# Patient Record
Sex: FEMALE | Employment: FULL TIME | URBAN - METROPOLITAN AREA
[De-identification: names, ages, dates, MRNs, and addresses within clinical notes are randomized per-mention and may not be internally consistent; named-entity substitution may affect disease eponyms.]

---

## 2019-08-26 LAB
HPV ABSTRACT: NORMAL
PAP-ABSTRACT: ABNORMAL

## 2020-06-21 ENCOUNTER — NURSE TRIAGE (OUTPATIENT)
Dept: NURSING | Facility: CLINIC | Age: 25
End: 2020-06-21

## 2020-06-21 NOTE — TELEPHONE ENCOUNTER
"Pt calls in ( is an [facility] pt)    Saying she has had this mole on the back of her neck that she has been picking at for the last few days     Says it has now scabbed over and kind of looks like a \" blood blister\"    Pt advised to either call her Clinic in morning when open to discuss or bypass Primary and find a Dermatologist that she may like and insurance covers and call them to have mole evaluated     Protocol and care advice reviewed  Caller states understanding of the recommended disposition    Advised to call back if further questions or concerns    Mekhi Vaughn , RN / [facility]                              Reason for Disposition    [1] Skin growth or mole AND [2] bleeds or itches or is painful    Additional Information    Negative: [1] Looks infected AND [2] large red area (> 2 in. or 5 cm)    Negative: [1] Fever AND [2] bump is tender to touch    Negative: [1] Fever AND [2] bright red area or streak    Negative: [1] Looks infected (spreading redness, pus) AND [2] no fever    Negative: Looks like a boil, infected sore, or deep ulcer    Negative: Caller can't describe it clearly    Negative: [1] Skin growth or mole AND [2] sticks up out of the skin (elevated) AND [3] feels rough to the touch    Negative: [1] Skin growth or mole AND[2] larger than a pencil eraser, or increasing in size    Negative: [1] Skin growth or mole AND [2] changes color, or it has more than one color    Negative: [1] Skin growth or mole AND [2] border is irregular or blurry    Negative: [1] Skin growth or mole AND [2] two sides do not look the same (i.e., asymmetric)    Protocols used: SKIN LESION - MOLES OR GROWTHS-A-AH      "

## 2021-01-04 LAB
HEP C HIM: NORMAL
HIV 1&2 EXT: NORMAL

## 2021-01-18 ENCOUNTER — HOSPITAL ENCOUNTER (OUTPATIENT)
Dept: BEHAVIORAL HEALTH | Facility: CLINIC | Age: 26
Discharge: HOME | End: 2021-01-18
Attending: PSYCHIATRY & NEUROLOGY | Admitting: PSYCHIATRY & NEUROLOGY
Payer: COMMERCIAL

## 2021-01-18 VITALS — HEIGHT: 62 IN | BODY MASS INDEX: 26.68 KG/M2 | WEIGHT: 145 LBS

## 2021-01-18 PROCEDURE — H0001 ALCOHOL AND/OR DRUG ASSESS: HCPCS | Mod: 95

## 2021-01-18 RX ORDER — SERTRALINE HYDROCHLORIDE 100 MG/1
100 TABLET, FILM COATED ORAL
COMMUNITY
Start: 2020-12-09

## 2021-01-18 RX ORDER — ALBUTEROL SULFATE 90 UG/1
AEROSOL, METERED RESPIRATORY (INHALATION)
COMMUNITY
Start: 2021-01-08

## 2021-01-18 RX ORDER — BENZONATATE 100 MG/1
CAPSULE ORAL
COMMUNITY
Start: 2020-12-31 | End: 2021-12-15

## 2021-01-18 RX ORDER — BUSPIRONE HYDROCHLORIDE 10 MG/1
20 TABLET ORAL
COMMUNITY
Start: 2020-12-09

## 2021-01-18 RX ORDER — MONTELUKAST SODIUM 10 MG/1
10 TABLET ORAL
COMMUNITY
Start: 2019-09-07

## 2021-01-18 RX ORDER — TRAZODONE HYDROCHLORIDE 100 MG/1
TABLET ORAL
COMMUNITY
Start: 2020-12-14

## 2021-01-18 RX ORDER — SERTRALINE HYDROCHLORIDE 100 MG/1
TABLET, FILM COATED ORAL
COMMUNITY
Start: 2021-01-05

## 2021-01-18 RX ORDER — METRONIDAZOLE 500 MG/1
TABLET ORAL
COMMUNITY
Start: 2021-01-04 | End: 2021-12-15

## 2021-01-18 RX ORDER — DEXTROAMPHETAMINE SACCHARATE, AMPHETAMINE ASPARTATE MONOHYDRATE, DEXTROAMPHETAMINE SULFATE AND AMPHETAMINE SULFATE 7.5; 7.5; 7.5; 7.5 MG/1; MG/1; MG/1; MG/1
CAPSULE, EXTENDED RELEASE ORAL
COMMUNITY
Start: 2020-12-09

## 2021-01-18 RX ORDER — METRONIDAZOLE 500 MG/1
500 TABLET ORAL
COMMUNITY
Start: 2021-01-04 | End: 2021-12-15

## 2021-01-18 RX ORDER — MONTELUKAST SODIUM 10 MG/1
TABLET ORAL
COMMUNITY
Start: 2021-01-08

## 2021-01-18 RX ORDER — TRAZODONE HYDROCHLORIDE 100 MG/1
TABLET ORAL
COMMUNITY
Start: 2021-01-05

## 2021-01-18 RX ORDER — DULOXETIN HYDROCHLORIDE 60 MG/1
60 CAPSULE, DELAYED RELEASE ORAL
COMMUNITY
Start: 2019-09-07

## 2021-01-18 RX ORDER — BENZONATATE 100 MG/1
100 CAPSULE ORAL
COMMUNITY
Start: 2020-12-31 | End: 2021-12-15

## 2021-01-18 RX ORDER — ALBUTEROL SULFATE 90 UG/1
AEROSOL, METERED RESPIRATORY (INHALATION)
COMMUNITY
Start: 2020-03-03

## 2021-01-18 RX ORDER — BUSPIRONE HYDROCHLORIDE 10 MG/1
TABLET ORAL
COMMUNITY
Start: 2021-01-05

## 2021-01-18 RX ORDER — MOMETASONE FUROATE AND FORMOTEROL FUMARATE DIHYDRATE 100; 5 UG/1; UG/1
AEROSOL RESPIRATORY (INHALATION)
COMMUNITY
Start: 2020-05-11

## 2021-01-18 RX ORDER — DEXTROAMPHETAMINE SACCHARATE, AMPHETAMINE ASPARTATE MONOHYDRATE, DEXTROAMPHETAMINE SULFATE AND AMPHETAMINE SULFATE 7.5; 7.5; 7.5; 7.5 MG/1; MG/1; MG/1; MG/1
30 CAPSULE, EXTENDED RELEASE ORAL
COMMUNITY
Start: 2020-12-09

## 2021-01-18 ASSESSMENT — COLUMBIA-SUICIDE SEVERITY RATING SCALE - C-SSRS
REASONS FOR IDEATION LIFETIME: EQUALLY TO GET ATTENTION, REVENGE OR A REACTION FROM OTHERS AND TO END/STOP THE PAIN
6. HAVE YOU EVER DONE ANYTHING, STARTED TO DO ANYTHING, OR PREPARED TO DO ANYTHING TO END YOUR LIFE?: NO
TOTAL  NUMBER OF ABORTED OR SELF INTERRUPTED ATTEMPTS PAST LIFETIME: NO
5. HAVE YOU STARTED TO WORK OUT OR WORKED OUT THE DETAILS OF HOW TO KILL YOURSELF? DO YOU INTEND TO CARRY OUT THIS PLAN?: NO
TOTAL  NUMBER OF INTERRUPTED ATTEMPTS LIFETIME: NO
ATTEMPT LIFETIME: NO
TOTAL  NUMBER OF ABORTED OR SELF INTERRUPTED ATTEMPTS PAST 3 MONTHS: NO
6. HAVE YOU EVER DONE ANYTHING, STARTED TO DO ANYTHING, OR PREPARED TO DO ANYTHING TO END YOUR LIFE?: NO
4. HAVE YOU HAD THESE THOUGHTS AND HAD SOME INTENTION OF ACTING ON THEM?: NO
1. IN THE PAST MONTH, HAVE YOU WISHED YOU WERE DEAD OR WISHED YOU COULD GO TO SLEEP AND NOT WAKE UP?: NO
ATTEMPT PAST THREE MONTHS: NO
1. IN THE PAST MONTH, HAVE YOU WISHED YOU WERE DEAD OR WISHED YOU COULD GO TO SLEEP AND NOT WAKE UP?: YES
TOTAL  NUMBER OF INTERRUPTED ATTEMPTS PAST 3 MONTHS: NO

## 2021-01-18 ASSESSMENT — ANXIETY QUESTIONNAIRES
2. NOT BEING ABLE TO STOP OR CONTROL WORRYING: NEARLY EVERY DAY
3. WORRYING TOO MUCH ABOUT DIFFERENT THINGS: NEARLY EVERY DAY
5. BEING SO RESTLESS THAT IT IS HARD TO SIT STILL: SEVERAL DAYS
7. FEELING AFRAID AS IF SOMETHING AWFUL MIGHT HAPPEN: SEVERAL DAYS
IF YOU CHECKED OFF ANY PROBLEMS ON THIS QUESTIONNAIRE, HOW DIFFICULT HAVE THESE PROBLEMS MADE IT FOR YOU TO DO YOUR WORK, TAKE CARE OF THINGS AT HOME, OR GET ALONG WITH OTHER PEOPLE: EXTREMELY DIFFICULT
1. FEELING NERVOUS, ANXIOUS, OR ON EDGE: NEARLY EVERY DAY
6. BECOMING EASILY ANNOYED OR IRRITABLE: SEVERAL DAYS
GAD7 TOTAL SCORE: 12
4. TROUBLE RELAXING: NOT AT ALL

## 2021-01-18 ASSESSMENT — PATIENT HEALTH QUESTIONNAIRE - PHQ9: SUM OF ALL RESPONSES TO PHQ QUESTIONS 1-9: 24

## 2021-01-18 ASSESSMENT — MIFFLIN-ST. JEOR: SCORE: 1355.97

## 2021-01-18 ASSESSMENT — PAIN SCALES - GENERAL: PAINLEVEL: NO PAIN (0)
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
## 2021-01-18 NOTE — PROGRESS NOTES
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 will have open access to their mental health medical record.        Provider Name/ Credentials:  DANO Collado  January 18, 2021

## 2021-01-19 ASSESSMENT — ANXIETY QUESTIONNAIRES: GAD7 TOTAL SCORE: 12

## 2021-02-25 LAB
C TRACH DNA SPEC QL PROBE+SIG AMP: NEGATIVE
CREATININE (EXTERNAL): 0.69 MG/DL (ref 0.57–1.11)
GFR ESTIMATED (EXTERNAL): >60 ML/MIN/1.73M2
GFR ESTIMATED (IF AFRICAN AMERICAN) (EXTERNAL): >60 ML/MIN/1.73M2
GLUCOSE (EXTERNAL): 91 MG/DL (ref 65–100)
N GONORRHOEA DNA SPEC QL PROBE+SIG AMP: NEGATIVE
SPECIMEN DESCRIP: NORMAL
SPECIMEN DESCRIPTION: NORMAL

## 2021-03-02 ENCOUNTER — TRANSFERRED RECORDS (OUTPATIENT)
Dept: HEALTH INFORMATION MANAGEMENT | Facility: CLINIC | Age: 26
End: 2021-03-02

## 2021-03-07 ENCOUNTER — HEALTH MAINTENANCE LETTER (OUTPATIENT)
Age: 26
End: 2021-03-07

## 2021-07-02 LAB
ALT SERPL-CCNC: 16 IU/L (ref 8–45)
AST SERPL-CCNC: 20 IU/L (ref 2–40)

## 2021-10-10 ENCOUNTER — HEALTH MAINTENANCE LETTER (OUTPATIENT)
Age: 26
End: 2021-10-10

## 2021-12-15 ENCOUNTER — ANCILLARY PROCEDURE (OUTPATIENT)
Dept: GENERAL RADIOLOGY | Facility: OTHER | Age: 26
End: 2021-12-15
Attending: PODIATRIST
Payer: COMMERCIAL

## 2021-12-15 ENCOUNTER — OFFICE VISIT (OUTPATIENT)
Dept: PODIATRY | Facility: OTHER | Age: 26
End: 2021-12-15
Payer: COMMERCIAL

## 2021-12-15 ENCOUNTER — TELEPHONE (OUTPATIENT)
Dept: PODIATRY | Facility: CLINIC | Age: 26
End: 2021-12-15

## 2021-12-15 VITALS
HEIGHT: 62 IN | WEIGHT: 171 LBS | SYSTOLIC BLOOD PRESSURE: 136 MMHG | BODY MASS INDEX: 31.47 KG/M2 | DIASTOLIC BLOOD PRESSURE: 92 MMHG

## 2021-12-15 DIAGNOSIS — M77.51 BURSITIS OF RIGHT FOOT: ICD-10-CM

## 2021-12-15 DIAGNOSIS — Z96.9 RETAINED ORTHOPEDIC HARDWARE: Primary | ICD-10-CM

## 2021-12-15 DIAGNOSIS — Z11.59 ENCOUNTER FOR SCREENING FOR OTHER VIRAL DISEASES: ICD-10-CM

## 2021-12-15 DIAGNOSIS — M79.671 RIGHT FOOT PAIN: ICD-10-CM

## 2021-12-15 PROCEDURE — 99203 OFFICE O/P NEW LOW 30 MIN: CPT | Performed by: PODIATRIST

## 2021-12-15 PROCEDURE — 73630 X-RAY EXAM OF FOOT: CPT | Mod: RT | Performed by: RADIOLOGY

## 2021-12-15 RX ORDER — ESCITALOPRAM OXALATE 20 MG/1
20 TABLET ORAL
COMMUNITY
Start: 2021-10-13

## 2021-12-15 RX ORDER — LISINOPRIL 20 MG/1
20 TABLET ORAL
COMMUNITY
Start: 2021-10-13

## 2021-12-15 RX ORDER — DOCUSATE SODIUM 100 MG/1
100 CAPSULE, LIQUID FILLED ORAL
COMMUNITY
Start: 2021-10-08

## 2021-12-15 RX ORDER — IBUPROFEN 800 MG/1
TABLET, FILM COATED ORAL
COMMUNITY
Start: 2021-12-11

## 2021-12-15 ASSESSMENT — PAIN SCALES - GENERAL: PAINLEVEL: SEVERE PAIN (7)

## 2021-12-15 ASSESSMENT — MIFFLIN-ST. JEOR: SCORE: 1465.9

## 2021-12-15 NOTE — PROGRESS NOTES
HPI:  Ary Wheeler is a 26 year old female who is seen in consultation at the request of self.    Pt presents for eval of:   (Onset, Location, L/R, Character, Treatments, Injury if yes)    XR Right foot 12/15/2021  XR Right foot at [facility] 11/26/2021 - do not have images at this time     Onset early Oct 2021, lump dorsal base of Right 5th toe, top of incision site from surgery April 2018 for ORIF Right 5th metatarsal.  Constant, redness, swelling, increased in size, sharp, burning, throbbing, pain 7-10/10. Worse with steel toed work shoes.   July 2021 inpatient treatment for drugs and alcohol.     Works at [employer] in [employer], on feet 10 hour work day, required to wear steel toed shoes.    ROS:  10 point ROS neg other than the symptoms noted above in the HPI.    There is no problem list on file for this patient.      PAST MEDICAL HISTORY: History reviewed. No pertinent past medical history.     PAST SURGICAL HISTORY: History reviewed. No pertinent surgical history.     MEDICATIONS:   Current Outpatient Medications:      albuterol (PROAIR HFA/PROVENTIL HFA/VENTOLIN HFA) 108 (90 Base) MCG/ACT inhaler, , Disp: , Rfl:      amphetamine-dextroamphetamine (ADDERALL XR) 30 MG 24 hr capsule, TAKE 1 CAPSULE BY MOUTH ONCE DAILY, Disp: , Rfl:      busPIRone (BUSPAR) 10 MG tablet, Take 20 mg by mouth, Disp: , Rfl:      docusate sodium (DSS) 100 MG capsule, Take 100 mg by mouth, Disp: , Rfl:      DULERA 100-5 MCG/ACT inhaler, INHALE 2 PUFFS BY MOUTH TWICE DAILY, Disp: , Rfl:      DULoxetine (CYMBALTA) 60 MG capsule, Take 60 mg by mouth, Disp: , Rfl:      escitalopram (LEXAPRO) 20 MG tablet, Take 20 mg by mouth, Disp: , Rfl:      ibuprofen (ADVIL/MOTRIN) 800 MG tablet, , Disp: , Rfl:      lisinopril (ZESTRIL) 20 MG tablet, Take 20 mg by mouth, Disp: , Rfl:      montelukast (SINGULAIR) 10 MG tablet, , Disp: , Rfl:      sertraline (ZOLOFT) 100 MG tablet, Take 100 mg by mouth, Disp: , Rfl:      traZODone (DESYREL)  100 MG tablet, TAKE 1 2 (ONE HALF) TABLET BY MOUTH AT BEDTIME, Disp: , Rfl:      albuterol (PROAIR HFA/PROVENTIL HFA/VENTOLIN HFA) 108 (90 Base) MCG/ACT inhaler, , Disp: , Rfl:      amphetamine-dextroamphetamine (ADDERALL XR) 30 MG 24 hr capsule, Take 30 mg by mouth, Disp: , Rfl:      busPIRone (BUSPAR) 10 MG tablet, , Disp: , Rfl:      montelukast (SINGULAIR) 10 MG tablet, Take 10 mg by mouth, Disp: , Rfl:      sertraline (ZOLOFT) 100 MG tablet, , Disp: , Rfl:      traZODone (DESYREL) 100 MG tablet, , Disp: , Rfl:      ALLERGIES:    Allergies   Allergen Reactions     Megavite Fruits & Veggies [Hair-Vites]      Pollen Extract         SOCIAL HISTORY:   Social History     Socioeconomic History     Marital status: Single     Spouse name: Not on file     Number of children: Not on file     Years of education: Not on file     Highest education level: Not on file   Occupational History     Not on file   Tobacco Use     Smoking status: Current Every Day Smoker     Packs/day: 0.50     Types: Cigarettes     Smokeless tobacco: Former User   Substance and Sexual Activity     Alcohol use: Not on file     Drug use: Not on file     Sexual activity: Not on file   Other Topics Concern     Parent/sibling w/ CABG, MI or angioplasty before 65F 55M? Not Asked   Social History Narrative     Not on file     Social Determinants of Health     Financial Resource Strain: Not on file   Food Insecurity: Not on file   Transportation Needs: Not on file   Physical Activity: Not on file   Stress: Not on file   Social Connections: Not on file   Intimate Partner Violence: Not on file   Housing Stability: Not on file        FAMILY HISTORY:   Family History   Problem Relation Age of Onset     Anxiety Disorder Mother      Depression Mother      Substance Abuse Mother      Depression Father      Anxiety Disorder Father      Substance Abuse Father      Substance Abuse Paternal Grandmother      Substance Abuse Paternal Grandfather      Mental Illness  "Other         EXAM:Vitals: BP (!) 136/92 (BP Location: Right arm, Patient Position: Sitting, Cuff Size: Adult Regular)   Ht 1.57 m (5' 1.81\")   Wt 77.6 kg (171 lb)   BMI 31.47 kg/m    BMI= Body mass index is 31.47 kg/m .    General appearance: Patient is alert and fully cooperative with history & exam.  No sign of distress is noted during the visit.     Psychiatric: Affect is pleasant & appropriate.  Patient appears motivated to improve health.     Respiratory: Breathing is regular & unlabored while sitting.     HEENT: Hearing is intact to spoken word.  Speech is clear.  No gross evidence of visual impairment that would impact ambulation.     Vascular: DP & PT pulses are intact & regular bilaterally.  No significant edema or varicosities noted.  CFT and skin temperature is normal to both lower extremities.     Neurologic: Lower extremity sensation is intact to light touch.  No evidence of weakness or contracture in the lower extremities.  No evidence of neuropathy.    Dermatologic: Skin is intact to both lower extremities with adequate texture, turgor and tone about the integument.  No paronychia or evidence of soft tissue infection is noted.     Musculoskeletal: Patient is ambulatory without assistive device or brace.  There is gross prominence directly over the lateral fifth metatarsal head.  The plate is palpable prominently and localized erythema and edema noted.  There is no pain throughout range of motion of the metatarsal phalangeal joint only over the direct prominence even light touch causes pain.    Radiographs 3 views right foot 12/15/2021 mildly prominent distal portion of the plate over the fifth metatarsal condyle.  This is consistent with a [place] plate and Torx screws possibly 2.0 mm                ASSESSMENT:       ICD-10-CM    1. Retained orthopedic hardware  Z96.9    2. Bursitis of right foot  M77.51         PLAN:  Reviewed patient's chart in [facility].      12/15/2021   Obtained and interpreted " radiographs  Discussed padding splinting and accommodation wider shoes different socks etc. versus injections oral anti-inflammatories versus excision of the plate.  After discussion of this she feels she has exhausted wider shoes padding splinting changes and socks and is utilizing oral ibuprofen 800 mg 4 times daily.  This is not resolving her concern and she would like to have the plate removed.  We discussed the postoperative cares requiring 2-4 weeks of some work restrictions.  She would like to schedule this in January.      Candido Cristobal DPM      Please schedule for surgery, pre op H&P, and post ops.    Surgery:  Patient Name:  Ary Wheeler ([id number])  Procedure:   Excision hardware right foot   Diagnosis:    Painful retained hardware right foot  Assistant: Single scrub tech  Surgeon:  Candido Cristobal DPM  Anesthesia:  MAC  PT type:  Same Day Surgery  Time needed: 45 minutes  Patient position:  lying supine  Mini fluoro:  Yes  C-arm:  no  Equipment: Hardware removal set to remove small 2.0 mm Torx screws from the [place] metatarsal plate   Anticoagulation:  No  Vendor:  no  Surgeon Notes: [place] metatarsal plate    Post op appts:    5-7 days po  12-15 days po  approx 4 weeks    Expected work restrictions:  full weight bearing for short periods of time in post op shoe with work restrictions    [facility] Discussed:  NO    Urgency to Schedule: Patient would like to schedule in January

## 2021-12-15 NOTE — LETTER
12/15/2021         RE: Ary Wheeler  [address]  [facility] [address]        Dear Colleague,    Thank you for referring your patient, Ary Wheeler, to the [facility]. Please see a copy of my visit note below.    HPI:  Ary Wheeler is a 26 year old female who is seen in consultation at the request of self.    Pt presents for eval of:   (Onset, Location, L/R, Character, Treatments, Injury if yes)    XR Right foot at [facility] 11/26/2021 - do not have images at this time     Onset early Oct 2021, lump dorsal base of Right 5th toe, top of incision site from surgery April 2018 for ORIF Right 5th metatarsal.  Constant, redness, swelling, increased in size, sharp, burning, throbbing, pain 7-10/10. Worse with steel toed work shoes.   July 2021 inpatient treatment for drugs and alcohol.     Works at [employer] in [employer], on feet 10 hour work day, required to wear steel toed shoes.    ROS:  10 point ROS neg other than the symptoms noted above in the HPI.    There is no problem list on file for this patient.      PAST MEDICAL HISTORY: History reviewed. No pertinent past medical history.     PAST SURGICAL HISTORY: History reviewed. No pertinent surgical history.     MEDICATIONS:   Current Outpatient Medications:      albuterol (PROAIR HFA/PROVENTIL HFA/VENTOLIN HFA) 108 (90 Base) MCG/ACT inhaler, , Disp: , Rfl:      amphetamine-dextroamphetamine (ADDERALL XR) 30 MG 24 hr capsule, TAKE 1 CAPSULE BY MOUTH ONCE DAILY, Disp: , Rfl:      busPIRone (BUSPAR) 10 MG tablet, Take 20 mg by mouth, Disp: , Rfl:      docusate sodium (DSS) 100 MG capsule, Take 100 mg by mouth, Disp: , Rfl:      DULERA 100-5 MCG/ACT inhaler, INHALE 2 PUFFS BY MOUTH TWICE DAILY, Disp: , Rfl:      DULoxetine (CYMBALTA) 60 MG capsule, Take 60 mg by mouth, Disp: , Rfl:      escitalopram (LEXAPRO) 20 MG tablet, Take 20 mg by mouth, Disp: , Rfl:      ibuprofen (ADVIL/MOTRIN) 800 MG tablet, ,  Disp: , Rfl:      lisinopril (ZESTRIL) 20 MG tablet, Take 20 mg by mouth, Disp: , Rfl:      montelukast (SINGULAIR) 10 MG tablet, , Disp: , Rfl:      sertraline (ZOLOFT) 100 MG tablet, Take 100 mg by mouth, Disp: , Rfl:      traZODone (DESYREL) 100 MG tablet, TAKE 1 2 (ONE HALF) TABLET BY MOUTH AT BEDTIME, Disp: , Rfl:      albuterol (PROAIR HFA/PROVENTIL HFA/VENTOLIN HFA) 108 (90 Base) MCG/ACT inhaler, , Disp: , Rfl:      amphetamine-dextroamphetamine (ADDERALL XR) 30 MG 24 hr capsule, Take 30 mg by mouth, Disp: , Rfl:      busPIRone (BUSPAR) 10 MG tablet, , Disp: , Rfl:      montelukast (SINGULAIR) 10 MG tablet, Take 10 mg by mouth, Disp: , Rfl:      sertraline (ZOLOFT) 100 MG tablet, , Disp: , Rfl:      traZODone (DESYREL) 100 MG tablet, , Disp: , Rfl:      ALLERGIES:    Allergies   Allergen Reactions     Megavite Fruits & Veggies [Hair-Vites]      Pollen Extract         SOCIAL HISTORY:   Social History     Socioeconomic History     Marital status: Single     Spouse name: Not on file     Number of children: Not on file     Years of education: Not on file     Highest education level: Not on file   Occupational History     Not on file   Tobacco Use     Smoking status: Current Every Day Smoker     Packs/day: 0.50     Types: Cigarettes     Smokeless tobacco: Former User   Substance and Sexual Activity     Alcohol use: Not on file     Drug use: Not on file     Sexual activity: Not on file   Other Topics Concern     Parent/sibling w/ CABG, MI or angioplasty before 65F 55M? Not Asked   Social History Narrative     Not on file     Social Determinants of Health     Financial Resource Strain: Not on file   Food Insecurity: Not on file   Transportation Needs: Not on file   Physical Activity: Not on file   Stress: Not on file   Social Connections: Not on file   Intimate Partner Violence: Not on file   Housing Stability: Not on file        FAMILY HISTORY:   Family History   Problem Relation Age of Onset     Anxiety Disorder  "Mother      Depression Mother      Substance Abuse Mother      Depression Father      Anxiety Disorder Father      Substance Abuse Father      Substance Abuse Paternal Grandmother      Substance Abuse Paternal Grandfather      Mental Illness Other         EXAM:Vitals: BP (!) 136/92 (BP Location: Right arm, Patient Position: Sitting, Cuff Size: Adult Regular)   Ht 1.57 m (5' 1.81\")   Wt 77.6 kg (171 lb)   BMI 31.47 kg/m    BMI= Body mass index is 31.47 kg/m .    General appearance: Patient is alert and fully cooperative with history & exam.  No sign of distress is noted during the visit.     Psychiatric: Affect is pleasant & appropriate.  Patient appears motivated to improve health.     Respiratory: Breathing is regular & unlabored while sitting.     HEENT: Hearing is intact to spoken word.  Speech is clear.  No gross evidence of visual impairment that would impact ambulation.     Vascular: DP & PT pulses are intact & regular bilaterally.  No significant edema or varicosities noted.  CFT and skin temperature is normal to both lower extremities.     Neurologic: Lower extremity sensation is intact to light touch.  No evidence of weakness or contracture in the lower extremities.  No evidence of neuropathy.    Dermatologic: Skin is intact to both lower extremities with adequate texture, turgor and tone about the integument.  No paronychia or evidence of soft tissue infection is noted.     Musculoskeletal: Patient is ambulatory without assistive device or brace.  There is gross prominence directly over the lateral fifth metatarsal head.  The plate is palpable prominently and localized erythema and edema noted.  There is no pain throughout range of motion of the metatarsal phalangeal joint only over the direct prominence even light touch causes pain.    Radiographs 3 views right foot 12/15/2021 mildly prominent distal portion of the plate over the fifth metatarsal condyle.  This is consistent with a [place] plate and " Torx screws possibly 2.0 mm                ASSESSMENT:       ICD-10-CM    1. Retained orthopedic hardware  Z96.9    2. Bursitis of right foot  M77.51         PLAN:  Reviewed patient's chart in [facility].      12/15/2021   Obtained and interpreted radiographs  Discussed padding splinting and accommodation wider shoes different socks etc. versus injections oral anti-inflammatories versus excision of the plate.  After discussion of this she feels she has exhausted wider shoes padding splinting changes and socks and is utilizing oral ibuprofen 800 mg 4 times daily.  This is not resolving her concern and she would like to have the plate removed.  We discussed the postoperative cares requiring 2-4 weeks of some work restrictions.  She would like to schedule this in January.      Candido Cristobal DPM      Please schedule for surgery, pre op H&P, and post ops.    Surgery:  Patient Name:  Ary Wheeler ([id number])  Procedure:   Excision hardware right foot   Diagnosis:    Painful retained hardware right foot  Assistant: Single scrub tech  Surgeon:  Candido Cristobal DPM  Anesthesia:  MAC  PT type:  Same Day Surgery  Time needed: 45 minutes  Patient position:  lying supine  Mini fluoro:  Yes  C-arm:  no  Equipment: Hardware removal set to remove small 2.0 mm Torx screws from the [place] metatarsal plate   Anticoagulation:  No  Vendor:  no  Surgeon Notes: [place] metatarsal plate    Post op appts:    5-7 days po  12-15 days po  approx 4 weeks    Expected work restrictions:  full weight bearing for short periods of time in post op shoe with work restrictions    [facility] Discussed:  NO    Urgency to Schedule: Patient would like to schedule in January            Again, thank you for allowing me to participate in the care of your patient.        Sincerely,        Candido Cristobal DPM

## 2021-12-15 NOTE — TELEPHONE ENCOUNTER
Reason for Call:  Other call back    Detailed comments: to schedule surgery.  Patient works overnights and will wait up for call this am if possible.  thanks    Phone Number Patient can be reached at: Home number on file [phone] (home), Work number on file:  There is no work phone number on file. or Cell number on file:    Telephone Information:   Mobile [phone]       Best Time: any    Can we leave a detailed message on this number? YES    Call taken on 12/15/2021 at 10:50 AM by Jessica Perdomo

## 2021-12-15 NOTE — TELEPHONE ENCOUNTER
Spoke to patient, she wants something sooner that [facility] next OR openings.     Discussing options with MD and will call patient back today

## 2021-12-15 NOTE — TELEPHONE ENCOUNTER
Type of surgery: Excision hardware right foot    Location of surgery: [facility]  Date and time of surgery: 1/6  Surgeon: Susu  Pre-Op Appt Date: Alyson  Post-Op Appt Date: 1/12 1/19 2/2   Packet sent out: Yes  Pre-cert/Authorization completed:  Not Applicable  Date: na

## 2021-12-17 ENCOUNTER — NURSE TRIAGE (OUTPATIENT)
Dept: NURSING | Facility: CLINIC | Age: 26
End: 2021-12-17
Payer: COMMERCIAL

## 2021-12-18 NOTE — TELEPHONE ENCOUNTER
"Patient calling to report increased pain and lump that is to \"pinky side\" of her right foot that has increased in size over the past two days ago.    Patient reports seeing a podiatrist last week and an Xray shows that a plate that was inserted three years ago is \"shifting\".    Patient reports that she is scheduled for surgery on 01/06/2022.      Pain 3/10 currently without movement, typically is a 9-15/10 and unbearable and throbbing.    Patient completed a 10 hour shift today and noticed discoloration to foot this evening.  Patient describes it as \"purple to bluish tint\" of skin.    According to the protocol, patient should go to ED now. Patient verbalizes understanding and agrees with plan of care.     Anupama Mendez RN  12/17/21 6:27 PM  [facility] Nurse Advisor     COVID 19 Nurse Triage Plan/Patient Instructions    Please be aware that novel coronavirus (COVID-19) may be circulating in the community. If you develop symptoms such as fever, cough, or SOB or if you have concerns about the presence of another infection including coronavirus (COVID-19), please contact your health care provider or visit [email or web address].     Disposition/Instructions    ED Visit recommended. Follow protocol based instructions.     Bring Your Own Device:  Please also bring your smart device(s) (smart phones, tablets, laptops) and their charging cables for your personal use and to communicate with your care team during your visit.    Thank you for taking steps to prevent the spread of this virus.  o Limit your contact with others.  o Wear a simple mask to cover your cough.  o Wash your hands well and often.    Resources    [facility]: About COVID-19: [email or web address]    CDC: What to Do If You're Sick: www.cdc.gov/coronavirus/2019-ncov/about/steps-when-sick.html    CDC: Ending Home Isolation: www.cdc.gov/coronavirus/2019-ncov/hcp/disposition-in-home-patients.html     CDC: Caring for Someone: " www.cdc.gov/coronavirus/2019-ncov/if-you-are-sick/care-for-someone.html     [facility]: Interim Guidance for Hospital Discharge to Home: www.health.[facility].[place].us/diseases/coronavirus/hcp/hospdischarge.pdf    [facility] clinical trials (COVID-19 research studies): clinicalaffairs.[facility]-clinical-trials     Below are the COVID-19 hotlines at the [place] Department of [facility] ([facility]). Interpreters are available.   o For health questions: Call 372-526-5310 or 1-469.413.9246 (7 a.m. to 7 p.m.)  o For questions about schools and childcare: Call 596-500-7250 or 1-124.618.3889 (7 a.m. to 7 p.m.)       Reason for Disposition    Purple or black skin on foot or toe    Additional Information    Negative: Followed a foot injury    Negative: Diabetes    Negative: Ankle pain is main symptom    Negative: Thigh or calf pain is main symptom    Negative: Entire foot is cool or blue in comparison to other foot    Protocols used: FOOT PAIN-A-AH

## 2021-12-19 ENCOUNTER — NURSE TRIAGE (OUTPATIENT)
Dept: NURSING | Facility: CLINIC | Age: 26
End: 2021-12-19
Payer: COMMERCIAL

## 2021-12-19 NOTE — TELEPHONE ENCOUNTER
Patient called.  She has been having foot problems the last 2 months.  Has been seen by the podiatrist.  Foot plate is moving, foot surgery scheduled on Jan 6th.  Put pressure on right foot this evening when she was washing her hands and her foot started hurting really bad.    Foot is swollen and very painful pain is 10/10.  Swelling is getting worse and foot is colored yellow.    Patient has no PCP.  Explained to patient she could go to  in the morning to have her foot looked at.  She stated she went there before and they didn't help.  Patient stated she wants to go to the ED.    Christi Kate RN   12/19/21 4:11 AM  [facility] Nurse Advisor    Reason for Disposition    [1] SEVERE pain (e.g., excruciating, unable to do any normal activities) AND [2] not improved after 2 hours of pain medicine    Additional Information    Negative: Followed a foot injury    Negative: Diabetes    Negative: Ankle pain is main symptom    Negative: Thigh or calf pain is main symptom    Negative: Entire foot is cool or blue in comparison to other foot    Negative: Purple or black skin on foot or toe    Negative: [1] Red area or streak AND [2] fever    Negative: [1] Swollen foot AND [2] fever    Negative: Patient sounds very sick or weak to the triager    Protocols used: FOOT PAIN-A-AH

## 2021-12-31 ENCOUNTER — TRANSFERRED RECORDS (OUTPATIENT)
Dept: MULTI SPECIALTY CLINIC | Facility: CLINIC | Age: 26
End: 2021-12-31
Payer: COMMERCIAL

## 2021-12-31 LAB — POTASSIUM (EXTERNAL): 4.5 MMOL/L (ref 3.5–5)

## 2022-01-06 ENCOUNTER — ANESTHESIA EVENT (OUTPATIENT)
Dept: SURGERY | Facility: CLINIC | Age: 27
End: 2022-01-06
Payer: COMMERCIAL

## 2022-01-06 ENCOUNTER — HOSPITAL ENCOUNTER (OUTPATIENT)
Facility: CLINIC | Age: 27
Discharge: HOME | End: 2022-01-06
Attending: PODIATRIST | Admitting: PODIATRIST
Payer: COMMERCIAL

## 2022-01-06 ENCOUNTER — ANESTHESIA (OUTPATIENT)
Dept: SURGERY | Facility: CLINIC | Age: 27
End: 2022-01-06
Payer: COMMERCIAL

## 2022-01-06 ENCOUNTER — TELEPHONE (OUTPATIENT)
Dept: PODIATRY | Facility: CLINIC | Age: 27
End: 2022-01-06

## 2022-01-06 VITALS
SYSTOLIC BLOOD PRESSURE: 132 MMHG | RESPIRATION RATE: 16 BRPM | HEIGHT: 62 IN | TEMPERATURE: 98.1 F | HEART RATE: 86 BPM | DIASTOLIC BLOOD PRESSURE: 82 MMHG | WEIGHT: 171 LBS | BODY MASS INDEX: 31.47 KG/M2 | OXYGEN SATURATION: 98 %

## 2022-01-06 DIAGNOSIS — Z96.9 RETAINED ORTHOPEDIC HARDWARE: ICD-10-CM

## 2022-01-06 DIAGNOSIS — M77.51 BURSITIS OF RIGHT FOOT: ICD-10-CM

## 2022-01-06 PROCEDURE — 710N000012 HC RECOVERY PHASE 2, PER MINUTE: Performed by: PODIATRIST

## 2022-01-06 PROCEDURE — 250N000009 HC RX 250: Performed by: PODIATRIST

## 2022-01-06 PROCEDURE — 250N000009 HC RX 250: Performed by: NURSE ANESTHETIST, CERTIFIED REGISTERED

## 2022-01-06 PROCEDURE — 370N000017 HC ANESTHESIA TECHNICAL FEE, PER MIN: Performed by: PODIATRIST

## 2022-01-06 PROCEDURE — 272N000001 HC OR GENERAL SUPPLY STERILE: Performed by: PODIATRIST

## 2022-01-06 PROCEDURE — 360N000074 HC SURGERY LEVEL 1, PER MIN: Performed by: PODIATRIST

## 2022-01-06 PROCEDURE — 20680 REMOVAL OF IMPLANT DEEP: CPT | Mod: RT | Performed by: PODIATRIST

## 2022-01-06 PROCEDURE — 258N000003 HC RX IP 258 OP 636: Performed by: NURSE ANESTHETIST, CERTIFIED REGISTERED

## 2022-01-06 PROCEDURE — 250N000011 HC RX IP 250 OP 636: Performed by: NURSE ANESTHETIST, CERTIFIED REGISTERED

## 2022-01-06 PROCEDURE — 250N000011 HC RX IP 250 OP 636: Performed by: PODIATRIST

## 2022-01-06 PROCEDURE — 999N000141 HC STATISTIC PRE-PROCEDURE NURSING ASSESSMENT: Performed by: PODIATRIST

## 2022-01-06 RX ORDER — ONDANSETRON 4 MG/1
4 TABLET, ORALLY DISINTEGRATING ORAL
Status: DISCONTINUED | OUTPATIENT
Start: 2022-01-06 | End: 2022-01-06 | Stop reason: HOSPADM

## 2022-01-06 RX ORDER — PROPOFOL 10 MG/ML
INJECTION, EMULSION INTRAVENOUS PRN
Status: DISCONTINUED | OUTPATIENT
Start: 2022-01-06 | End: 2022-01-06

## 2022-01-06 RX ORDER — FENTANYL CITRATE 50 UG/ML
25 INJECTION, SOLUTION INTRAMUSCULAR; INTRAVENOUS
Status: DISCONTINUED | OUTPATIENT
Start: 2022-01-06 | End: 2022-01-06 | Stop reason: HOSPADM

## 2022-01-06 RX ORDER — ONDANSETRON 2 MG/ML
INJECTION INTRAMUSCULAR; INTRAVENOUS PRN
Status: DISCONTINUED | OUTPATIENT
Start: 2022-01-06 | End: 2022-01-06

## 2022-01-06 RX ORDER — CEFAZOLIN SODIUM 2 G/100ML
2 INJECTION, SOLUTION INTRAVENOUS SEE ADMIN INSTRUCTIONS
Status: DISCONTINUED | OUTPATIENT
Start: 2022-01-06 | End: 2022-01-06 | Stop reason: HOSPADM

## 2022-01-06 RX ORDER — PROPOFOL 10 MG/ML
INJECTION, EMULSION INTRAVENOUS CONTINUOUS PRN
Status: DISCONTINUED | OUTPATIENT
Start: 2022-01-06 | End: 2022-01-06

## 2022-01-06 RX ORDER — BUPIVACAINE HYDROCHLORIDE 5 MG/ML
INJECTION, SOLUTION PERINEURAL PRN
Status: DISCONTINUED | OUTPATIENT
Start: 2022-01-06 | End: 2022-01-06 | Stop reason: HOSPADM

## 2022-01-06 RX ORDER — OXYCODONE HYDROCHLORIDE 5 MG/1
5 TABLET ORAL EVERY 4 HOURS PRN
Status: DISCONTINUED | OUTPATIENT
Start: 2022-01-06 | End: 2022-01-06 | Stop reason: HOSPADM

## 2022-01-06 RX ORDER — SODIUM CHLORIDE 9 MG/ML
INJECTION, SOLUTION INTRAVENOUS CONTINUOUS
Status: DISCONTINUED | OUTPATIENT
Start: 2022-01-06 | End: 2022-01-06 | Stop reason: HOSPADM

## 2022-01-06 RX ORDER — FENTANYL CITRATE 50 UG/ML
50 INJECTION, SOLUTION INTRAMUSCULAR; INTRAVENOUS
Status: DISCONTINUED | OUTPATIENT
Start: 2022-01-06 | End: 2022-01-06 | Stop reason: HOSPADM

## 2022-01-06 RX ORDER — FENTANYL CITRATE 50 UG/ML
50 INJECTION, SOLUTION INTRAMUSCULAR; INTRAVENOUS
Status: DISCONTINUED | OUTPATIENT
Start: 2022-01-06 | End: 2022-01-06

## 2022-01-06 RX ORDER — OXYCODONE HYDROCHLORIDE 5 MG/1
5-10 TABLET ORAL EVERY 4 HOURS PRN
Qty: 12 TABLET | Refills: 0 | Status: SHIPPED | OUTPATIENT
Start: 2022-01-06 | End: 2022-01-19

## 2022-01-06 RX ORDER — ALBUTEROL SULFATE 0.83 MG/ML
2.5 SOLUTION RESPIRATORY (INHALATION) EVERY 4 HOURS PRN
Status: DISCONTINUED | OUTPATIENT
Start: 2022-01-06 | End: 2022-01-06 | Stop reason: HOSPADM

## 2022-01-06 RX ORDER — HYDRALAZINE HYDROCHLORIDE 20 MG/ML
2.5-5 INJECTION INTRAMUSCULAR; INTRAVENOUS EVERY 10 MIN PRN
Status: DISCONTINUED | OUTPATIENT
Start: 2022-01-06 | End: 2022-01-06 | Stop reason: HOSPADM

## 2022-01-06 RX ORDER — LIDOCAINE HYDROCHLORIDE 20 MG/ML
INJECTION, SOLUTION INFILTRATION; PERINEURAL PRN
Status: DISCONTINUED | OUTPATIENT
Start: 2022-01-06 | End: 2022-01-06

## 2022-01-06 RX ORDER — HYDROXYZINE HYDROCHLORIDE 25 MG/1
25 TABLET, FILM COATED ORAL
Status: DISCONTINUED | OUTPATIENT
Start: 2022-01-06 | End: 2022-01-06 | Stop reason: HOSPADM

## 2022-01-06 RX ORDER — CEFAZOLIN SODIUM 2 G/100ML
2 INJECTION, SOLUTION INTRAVENOUS
Status: COMPLETED | OUTPATIENT
Start: 2022-01-06 | End: 2022-01-06

## 2022-01-06 RX ORDER — FENTANYL CITRATE 50 UG/ML
INJECTION, SOLUTION INTRAMUSCULAR; INTRAVENOUS PRN
Status: DISCONTINUED | OUTPATIENT
Start: 2022-01-06 | End: 2022-01-06

## 2022-01-06 RX ORDER — METOPROLOL TARTRATE 1 MG/ML
1-2 INJECTION, SOLUTION INTRAVENOUS EVERY 5 MIN PRN
Status: DISCONTINUED | OUTPATIENT
Start: 2022-01-06 | End: 2022-01-06 | Stop reason: HOSPADM

## 2022-01-06 RX ORDER — ONDANSETRON 2 MG/ML
4 INJECTION INTRAMUSCULAR; INTRAVENOUS EVERY 30 MIN PRN
Status: DISCONTINUED | OUTPATIENT
Start: 2022-01-06 | End: 2022-01-06 | Stop reason: HOSPADM

## 2022-01-06 RX ORDER — FENTANYL CITRATE 50 UG/ML
25 INJECTION, SOLUTION INTRAMUSCULAR; INTRAVENOUS EVERY 5 MIN PRN
Status: DISCONTINUED | OUTPATIENT
Start: 2022-01-06 | End: 2022-01-06 | Stop reason: HOSPADM

## 2022-01-06 RX ORDER — DEXAMETHASONE SODIUM PHOSPHATE 10 MG/ML
INJECTION, SOLUTION INTRAMUSCULAR; INTRAVENOUS PRN
Status: DISCONTINUED | OUTPATIENT
Start: 2022-01-06 | End: 2022-01-06

## 2022-01-06 RX ORDER — KETOROLAC TROMETHAMINE 30 MG/ML
INJECTION, SOLUTION INTRAMUSCULAR; INTRAVENOUS PRN
Status: DISCONTINUED | OUTPATIENT
Start: 2022-01-06 | End: 2022-01-06

## 2022-01-06 RX ORDER — ONDANSETRON 4 MG/1
4 TABLET, ORALLY DISINTEGRATING ORAL EVERY 30 MIN PRN
Status: DISCONTINUED | OUTPATIENT
Start: 2022-01-06 | End: 2022-01-06 | Stop reason: HOSPADM

## 2022-01-06 RX ORDER — OXYCODONE HYDROCHLORIDE 5 MG/1
5 TABLET ORAL
Status: DISCONTINUED | OUTPATIENT
Start: 2022-01-06 | End: 2022-01-06 | Stop reason: HOSPADM

## 2022-01-06 RX ORDER — HYDROMORPHONE HYDROCHLORIDE 1 MG/ML
0.2 INJECTION, SOLUTION INTRAMUSCULAR; INTRAVENOUS; SUBCUTANEOUS EVERY 5 MIN PRN
Status: DISCONTINUED | OUTPATIENT
Start: 2022-01-06 | End: 2022-01-06 | Stop reason: HOSPADM

## 2022-01-06 RX ORDER — SODIUM CHLORIDE, SODIUM LACTATE, POTASSIUM CHLORIDE, CALCIUM CHLORIDE 600; 310; 30; 20 MG/100ML; MG/100ML; MG/100ML; MG/100ML
INJECTION, SOLUTION INTRAVENOUS CONTINUOUS
Status: DISCONTINUED | OUTPATIENT
Start: 2022-01-06 | End: 2022-01-06 | Stop reason: HOSPADM

## 2022-01-06 RX ORDER — METHOCARBAMOL 750 MG/1
750 TABLET, FILM COATED ORAL
Status: DISCONTINUED | OUTPATIENT
Start: 2022-01-06 | End: 2022-01-06 | Stop reason: HOSPADM

## 2022-01-06 RX ADMIN — PROPOFOL 30 MG: 10 INJECTION, EMULSION INTRAVENOUS at 10:38

## 2022-01-06 RX ADMIN — PROPOFOL 150 MCG/KG/MIN: 10 INJECTION, EMULSION INTRAVENOUS at 10:38

## 2022-01-06 RX ADMIN — FENTANYL CITRATE 50 MCG: 50 INJECTION, SOLUTION INTRAMUSCULAR; INTRAVENOUS at 10:35

## 2022-01-06 RX ADMIN — MIDAZOLAM 2 MG: 1 INJECTION INTRAMUSCULAR; INTRAVENOUS at 10:33

## 2022-01-06 RX ADMIN — PROPOFOL 40 MG: 10 INJECTION, EMULSION INTRAVENOUS at 10:39

## 2022-01-06 RX ADMIN — LIDOCAINE HYDROCHLORIDE 60 MG: 20 INJECTION, SOLUTION INFILTRATION; PERINEURAL at 10:38

## 2022-01-06 RX ADMIN — KETOROLAC TROMETHAMINE 30 MG: 30 INJECTION, SOLUTION INTRAMUSCULAR at 11:10

## 2022-01-06 RX ADMIN — LIDOCAINE HYDROCHLORIDE 1 ML: 10 INJECTION, SOLUTION EPIDURAL; INFILTRATION; INTRACAUDAL; PERINEURAL at 08:35

## 2022-01-06 RX ADMIN — ONDANSETRON 4 MG: 2 INJECTION INTRAMUSCULAR; INTRAVENOUS at 10:46

## 2022-01-06 RX ADMIN — FENTANYL CITRATE 50 MCG: 50 INJECTION INTRAMUSCULAR; INTRAVENOUS at 09:32

## 2022-01-06 RX ADMIN — DEXAMETHASONE SODIUM PHOSPHATE 10 MG: 10 INJECTION, SOLUTION INTRAMUSCULAR; INTRAVENOUS at 10:46

## 2022-01-06 RX ADMIN — CEFAZOLIN SODIUM 2 G: 2 INJECTION, SOLUTION INTRAVENOUS at 10:30

## 2022-01-06 RX ADMIN — SODIUM CHLORIDE: 9 INJECTION, SOLUTION INTRAVENOUS at 08:34

## 2022-01-06 RX ADMIN — PROPOFOL 70 MG: 10 INJECTION, EMULSION INTRAVENOUS at 10:50

## 2022-01-06 RX ADMIN — PROPOFOL 30 MG: 10 INJECTION, EMULSION INTRAVENOUS at 10:40

## 2022-01-06 ASSESSMENT — MIFFLIN-ST. JEOR: SCORE: 1465.88

## 2022-01-06 ASSESSMENT — LIFESTYLE VARIABLES: TOBACCO_USE: 1

## 2022-01-06 NOTE — ANESTHESIA CARE TRANSFER NOTE
Patient: Ary Wheeler    Procedure: Procedure(s):  Excision hardware right foot        Diagnosis: Retained orthopedic hardware [Z96.9]  Bursitis of right foot [M77.51]  Diagnosis Additional Information: No value filed.    Anesthesia Type:   MAC     Note:    Oropharynx: oropharynx clear of all foreign objects and spontaneously breathing  Level of Consciousness: drowsy  Oxygen Supplementation: face mask  Level of Supplemental Oxygen (L/min / FiO2): 6  Independent Airway: airway patency satisfactory and stable  Dentition: dentition unchanged  Vital Signs Stable: post-procedure vital signs reviewed and stable  Report to RN Given: handoff report given  Patient transferred to: Phase II    Handoff Report: Identifed the Patient, Identified the Reponsible Provider, Reviewed the pertinent medical history, Discussed the surgical course, Reviewed Intra-OP anesthesia mangement and issues during anesthesia, Set expectations for post-procedure period and Allowed opportunity for questions and acknowledgement of understanding      Vitals:  Vitals Value Taken Time   /78 01/06/22 1124   Temp     Pulse 90 01/06/22 1124   Resp     SpO2 95 % 01/06/22 1126   Vitals shown include unvalidated device data.    Electronically Signed By: DIA Dennison CRNA  January 6, 2022  11:28 AM

## 2022-01-06 NOTE — ANESTHESIA POSTPROCEDURE EVALUATION
Patient: Ary Wheeler    Procedure: Procedure(s):  Excision hardware right foot        Diagnosis:Retained orthopedic hardware [Z96.9]  Bursitis of right foot [M77.51]  Diagnosis Additional Information: No value filed.    Anesthesia Type:  MAC    Note:  Disposition: Outpatient   Postop Pain Control: Uneventful            Sign Out: Well controlled pain   PONV: No   Neuro/Psych: Uneventful            Sign Out: Acceptable/Baseline neuro status   Airway/Respiratory: Uneventful            Sign Out: Acceptable/Baseline resp. status   CV/Hemodynamics: Uneventful            Sign Out: Acceptable CV status   Other NRE: NONE   DID A NON-ROUTINE EVENT OCCUR? No    Event details/Postop Comments:  Pt was happy with anesthesia care.  No complications.  I will follow up with the pt if needed.           Last vitals:  Vitals Value Taken Time   /85 01/06/22 1130   Temp     Pulse 92 01/06/22 1130   Resp     SpO2 99 % 01/06/22 1133   Vitals shown include unvalidated device data.    Electronically Signed By: DIA Dennison CRNA  January 6, 2022  11:34 AM

## 2022-01-06 NOTE — ANESTHESIA PREPROCEDURE EVALUATION
Anesthesia Pre-Procedure Evaluation    Patient: Ary Wheeler   MRN: [id number] : 1995        Preoperative Diagnosis: Retained orthopedic hardware [Z96.9]  Bursitis of right foot [M77.51]    Procedure : Procedure(s):  Excision hardware right foot           History reviewed. No pertinent past medical history.   History reviewed. No pertinent surgical history.   Allergies   Allergen Reactions     Megavite Fruits & Veggies [Hair-Vites]      Pollen Extract       Social History     Tobacco Use     Smoking status: Current Every Day Smoker     Packs/day: 0.50     Types: Cigarettes     Smokeless tobacco: Former User   Substance Use Topics     Alcohol use: Not on file      Wt Readings from Last 1 Encounters:   22 77.6 kg (171 lb)        Anesthesia Evaluation            ROS/MED HX  ENT/Pulmonary:     (+) tobacco use, Current use, Moderate Persistent, asthma Treatment: Inhaler prn,      Neurologic:  - neg neurologic ROS     Cardiovascular:     (+) hypertension-----    METS/Exercise Tolerance:     Hematologic:  - neg hematologic  ROS     Musculoskeletal: Comment: Foot pain in right foot      GI/Hepatic:  - neg GI/hepatic ROS  (-) GERD   Renal/Genitourinary:  - neg Renal ROS     Endo:  - neg endo ROS     Psychiatric/Substance Use: Comment: Hx narcotic and THC abuse    (+) psychiatric history bipolar     Infectious Disease:  - neg infectious disease ROS     Malignancy:  - neg malignancy ROS     Other:  - neg other ROS          Physical Exam    Airway        Mallampati: II   TM distance: > 3 FB   Neck ROM: full   Mouth opening: > 3 cm    Respiratory Devices and Support         Dental  no notable dental history         Cardiovascular   cardiovascular exam normal       Rhythm and rate: regular and normal     Pulmonary   pulmonary exam normal        breath sounds clear to auscultation           OUTSIDE LABS:  CBC: No results found for: WBC, HGB, HCT, PLT  BMP: No results found for: NA, POTASSIUM, CHLORIDE,  CO2, BUN, CR, GLC  COAGS: No results found for: PTT, INR, FIBR  POC: No results found for: BGM, HCG, HCGS  HEPATIC: No results found for: ALBUMIN, PROTTOTAL, ALT, AST, GGT, ALKPHOS, BILITOTAL, BILIDIRECT, ARISTEO  OTHER: No results found for: PH, LACT, A1C, MICHELLE, PHOS, MAG, LIPASE, AMYLASE, TSH, T4, T3, CRP, SED    Anesthesia Plan    ASA Status:  3   NPO Status:  NPO Appropriate    Anesthesia Type: MAC.     - Reason for MAC: straight local not clinically adequate   Induction: Intravenous, Propofol.   Maintenance: TIVA.        Consents    Anesthesia Plan(s) and associated risks, benefits, and realistic alternatives discussed. Questions answered and patient/representative(s) expressed understanding.    - Discussed:     - Discussed with:  Patient, Parent (Mother and/or Father)      - Extended Intubation/Ventilatory Support Discussed: No.      - Patient is DNR/DNI Status: No    Use of blood products discussed: Yes.     - Discussed with: Patient.     - Consented: consented to blood products            Reason for refusal: other.     Postoperative Care    Pain management: IV analgesics, Oral pain medications.   PONV prophylaxis: Ondansetron (or other 5HT-3), Meclizine or Dimenhydrinate     Comments:    Other Comments: The risks and benefits of anesthesia, and the alternatives where applicable, have been discussed with the patient, and they wish to proceed.            DIA Dennison CRNA

## 2022-01-06 NOTE — OP NOTE
Procedure Date: 2022    SURGEON:  Candido Cristobal DPM    ASSISTANT:  None.    PREOPERATIVE DIAGNOSIS:  Painful retained hardware, right fifth metatarsal.    POSTOPERATIVE DIAGNOSIS:  Painful retained hardware, right fifth metatarsal.    PLANNED PROCEDURE:  Excision painful retained hardware, right fifth metatarsal.    DESCRIPTION OF PROCEDURE:  In preoperative holding area, informed consent was obtained.  We discussed potential risks, complications, and alternative treatment options.  No guarantees were given or implied.  She wished to proceed with the procedure.  She was brought to the operating room and placed on the operating table in supine position.  She was given monitored anesthetic care by the Anesthesia Department.  A well-padded right ankle tourniquet was applied.  A 15 blade was utilized to make a 5 cm incision after standard prep and drape.  This was made full thickness to the plate.  Small areas of bleeding were retracted medially and laterally.  All 5 screws were removed without complication.  The plate was removed without complication.  No bony prominence was noted after removing the plate.  Surgical site was flushed with copious amounts of sterile saline.  Soft tissue structures were closed with 3-0 Vicryl.  Skin was closed with 4-0 Prolene. Adaptic and a bulky sterile compression dressing was applied.  The patient tolerated the procedure and anesthesia well.  The tourniquet was released after approximately 15 minutes followed by immediate hyperemia to all 5 digits of the right foot.    Candido Cristobal DPM        D: 2022   T: 2022   MT: PAKMT    Name:     RONALD WATTS  MRN:      [id number]        Account:        [id number]   :      1995           Procedure Date: 2022     Document: [id number]

## 2022-01-06 NOTE — DISCHARGE INSTRUCTIONS
Post-Operative Discharge Instructions  Candido Cristobal DPM    Proper care after surgery is an important part of your surgical program.  In order to properly heal and have the best results,  it is very important to follow these instructions.    ? Go right home and keep your foot elevated (up) on the way     ? Use pillows to keep your feet 6 inches higher than the level of your hips    ? Cover an ice bag with a towel and put this behind your ankle/knee or near the surgical site for 20 minutes out of every hour that you are awake for the first 3 days after surgery     ? Keep your bandages or your cast clean and dry.  DO NOT remove the bandages or look at your surgical wound.  A small amount of blood on the bandage is normal.  If you were provided a boot or shoe keep the boot or shoe on your foot for sleeping unless directed otherwise. A boot or shoe is not always provided if you are to remain non weight bearing    ? Cover your bandage with a plastic bag and hang your leg outside the tub while you take a bath.  AVOID SHOWERS.  DO NOT GET THE BANDAGE OR THE CAST WET.    ? Take all medication as the doctor has ordered.  If you have an upset stomach, headache, rash, or other reactions that are not normal, stop taking and CALL YOUR DOCTOR!  You may call the clinic or the 24 hour [place] Nurse Line at [phone].     ? You should get plenty of rest with your foot elevated.  Drink plenty of fluids and eat a regular healthy diet.  Eating your meals faithfully with help prevent nausea from medications.    ? Do not drink alcohol while taking pain medicine and stop smoking.  Smoking will increase your chance of having problems like infection or problems with your bone and incision healing.             ? Walking instructions:       ? Follow up in the clinic at your scheduled appointment day and time    ? CALL THE OFFICE AT ONCE IF:  - The bandages becomes soaked through with blood  - The medicine does not help your pain or you  have a reaction to the medicine  - You bump or hurt the surgery site  - You have a fever  - You get your dressing or your cast wet  - You develop calf pain, redness, swelling, or shortness of breath (these are signs of a possible blood clot)    [facility]   Adult Discharge Orders & Instructions     For 24 hours after surgery    1. Get plenty of rest.  A responsible adult must stay with you for at least 24 hours after you leave the hospital.   2. Do not drive or use heavy equipment.  If you have weakness or tingling, don't drive or use heavy equipment until this feeling goes away.  3. Do not drink alcohol.  4. Avoid strenuous or risky activities.  Ask for help when climbing stairs.   5. You may feel lightheaded.  If so, sit for a few minutes before standing.  Have someone help you get up.   6. You may have a slight fever. Call the doctor if your fever is over 100 F (37.7 C) (taken under the tongue) or lasts longer than 24 hours.  7. You may have a dry mouth, a sore throat, muscle aches or trouble sleeping.  These should go away after 24 hours.  8. Do not make important or legal decisions.  We don t expect you to have any problems from the surgery or treatment you had today. Just in case, here s what to do if you have pain, upset stomach (nausea), bleeding or infection:  Pain:  Take medicines your doctor has prescribed or over-the-counter medicine they have suggested. Resting and using ice packs can help, too. For surgery on an arm or leg, raise it on a pillow to ease swelling. Call your doctor if these methods don t work.  Copyright Chaitanya Alonso, Licensed under [employer]  Upset stomach (nausea):  Take anti-nausea medicine approved by your doctor. Drink clear liquids like apple juice, ginger ale, broth or 7-Up. Be sure to drink enough fluids. Rest can help, too. Move to normal foods when you re ready. Bleeding:  In the first 24 hours, you may see a little blood on your dressing,  about the size of a quarter. You don t need to worry about this much blood, but if the blood spot keeps getting bigger:    Put pressure on the wound if you can, AND    Call your doctor.  Copyright [employer], Licensed under [employer]  Fever/Infection: Please call your doctor if you have any of these signs:    Redness    Swelling    Wound feels warm    Pain gets worse    Bad-smelling fluid leaks from wound    Fever or chills  Call your doctor for any of the followin.  It has been over 8 to 10 hours since surgery and you are still not able to urinate (pass water).    2.  Headache for over 24 hours.    3.  Numbness, tingling or weakness in your legs the day after surgery (if you had spinal anesthesia).    Nurse advice line: [phone]

## 2022-01-06 NOTE — INTERVAL H&P NOTE
"I have reviewed the surgical (or preoperative) H&P that is linked to this encounter, and examined the patient. Noted changes include: Pt was having \"significant pain\" in her foot and requested narcotic pain meds  "

## 2022-01-06 NOTE — ANESTHESIA CARE TRANSFER NOTE
Patient: Ary Wheeler    Procedure: Procedure(s):  Excision hardware right foot        Diagnosis: Retained orthopedic hardware [Z96.9]  Bursitis of right foot [M77.51]  Diagnosis Additional Information: No value filed.    Anesthesia Type:   MAC     Note:    Oropharynx: oropharynx clear of all foreign objects and spontaneously breathing  Level of Consciousness: drowsy  Oxygen Supplementation: face mask    Independent Airway: airway patency satisfactory and stable  Dentition: dentition unchanged  Vital Signs Stable: post-procedure vital signs reviewed and stable  Report to RN Given: handoff report given  Patient transferred to: Phase II    Handoff Report: Identifed the Patient, Identified the Reponsible Provider, Reviewed the pertinent medical history, Discussed the surgical course, Reviewed Intra-OP anesthesia mangement and issues during anesthesia, Set expectations for post-procedure period and Allowed opportunity for questions and acknowledgement of understanding      Vitals:  Vitals Value Taken Time   /85 01/06/22 1130   Temp     Pulse 92 01/06/22 1130   Resp     SpO2 98 % 01/06/22 1132   Vitals shown include unvalidated device data.    Electronically Signed By: DIA Dennison CRNA  January 6, 2022  11:34 AM

## 2022-01-07 NOTE — TELEPHONE ENCOUNTER
Reason for call:  Form   Our goal is to have forms completed within 72 hours, however some forms may require a visit or additional information.     Who is the form from? Patient  Where did the form come from? Patient has it, not sure how get it to clinic    Phone call message - patient request for a letter, form or note:     Date needed: as soon as possible  Please call the patient when completed  Has the patient signed a consent form for release of information? Not Applicable    Additional comments: Patient had surgery today, need form for work filled out along with work note.     Type of letter, form or note: work     Phone number to reach patient:  Cell number on file:    Telephone Information:   Mobile [phone]       Best Time:  ANY    Can we leave a detailed message on this number?  YES    Mona Blackman CMA

## 2022-01-11 NOTE — TELEPHONE ENCOUNTER
Attempt #2:    No Answer. Left call back requesting forms to be sent through patient's MyChart if unable to bring forms into the clinic. Waiting a call back.     Mala Hebert RN on 1/11/2022 at 12:30 PM

## 2022-01-11 NOTE — TELEPHONE ENCOUNTER
Left message for a call back regarding form needing to be filled out.    Mala Hebert RN on 1/11/2022 at 8:54 AM

## 2022-01-11 NOTE — TELEPHONE ENCOUNTER
Patient will be bringing forms into the clinic when she comes for her appointment tomorrow.     Mala Hebert RN on 1/11/2022 at 2:38 PM

## 2022-01-12 ENCOUNTER — ALLIED HEALTH/NURSE VISIT (OUTPATIENT)
Dept: FAMILY MEDICINE | Facility: CLINIC | Age: 27
End: 2022-01-12
Payer: COMMERCIAL

## 2022-01-12 DIAGNOSIS — Z51.89 ENCOUNTER FOR WOUND CARE: Primary | ICD-10-CM

## 2022-01-12 PROCEDURE — 99207 PR NO CHARGE NURSE ONLY: CPT

## 2022-01-12 NOTE — TELEPHONE ENCOUNTER
"Patient's form was filled out, reviewed, and signed by provider.      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    A copy was sent to scanning.     A copy was placed in 2nd floor RN \"faxed\" file/drawer.          Hien KERR, Lead RN, BSN. . .  1/12/2022, 4:10 PM    "

## 2022-01-12 NOTE — LETTER
[address] [address]  Ph: [phone]  Fax: [phone]          2022    RE: Ary Wheeler  : 1995      To whom it may concern:     The patient was seen today in our clinic for her first post-operative follow up. The patient has been out of work completely since 2021 and will remain out of work until her workability is re-evaluated at her next post operative follow up on 2022.   Please let me know if you have any questions or concerns.       Sincerely,             Dr. Cristobal - Podiatry

## 2022-01-12 NOTE — NURSING NOTE
Patient presented to clinic for a post-op dressing change per the order of Dr. Cristobal.  Incision CDI. Sutures intact.   Minimal bruising, swelling.  Previous dressing removed. No drainage.  Incision cleansed with MicroKlenz, adaptic placed over suture, shingled gauze, Kerlix, and an Ace wrap.   Edith Schultz RN on 1/12/2022 at 3:47 PM

## 2022-01-15 ENCOUNTER — TELEPHONE (OUTPATIENT)
Dept: NURSING | Facility: CLINIC | Age: 27
End: 2022-01-15
Payer: COMMERCIAL

## 2022-01-15 ENCOUNTER — NURSE TRIAGE (OUTPATIENT)
Dept: NURSING | Facility: CLINIC | Age: 27
End: 2022-01-15
Payer: COMMERCIAL

## 2022-01-15 NOTE — TELEPHONE ENCOUNTER
Ary had foot surgery on 1/6/22.  When bathing today her ace wrap got damp.  Unsure if gauze is wet.  I asked her to unwrap and check.  The gauze is damp. I suggested keeping the ace off for a short period of time to allow the gauze to dry.  I recommended hanging the ace wrap over shower with fan directed toward it to dry it before placing it back on.    Caller stated understanding and agreement.  Will call back as needed.         Reason for Disposition    Skin tape (e.g., Steri-strips) removal, questions about    Additional Information    Negative: [1] Major abdominal surgical incision AND [2] wound gaping open AND [3] visible internal organs    Negative: Sounds like a life-threatening emergency to the triager    Negative: [1] Bleeding from incision AND [2] won't stop after 10 minutes of direct pressure    Negative: [1] Widespread rash AND [2] bright red, sunburn-like    Negative: Severe pain in the incision    Negative: [1] Incision gaping open AND [2] < 48 hours since wound re-opened    Negative: [1] Incision gaping open AND [2] length of opening > 2 inches (5 cm)    Negative: Patient sounds very sick or weak to the triager    Negative: Sounds like a serious complication to the triager    Negative: Fever > 100.4 F (38.0 C)    Negative: [1] Incision looks infected (spreading redness, pain) AND [2] fever > 99.5 F (37.5 C)    Negative: [1] Incision looks infected (spreading redness, pain) AND [2] large red area (> 2 in. or 5 cm)    Negative: [1] Incision looks infected (spreading redness, pain) AND [2] face wound    Negative: [1] Red streak runs from the incision AND [2] longer than 1 inch (2.5 cm)    Negative: [1] Pus or bad-smelling fluid draining from incision AND [2] no fever    Negative: [1] Post-op pain AND [2] not controlled with pain medications    Negative: Dressing soaked with blood or body fluid (e.g., drainage)    Negative: [1] Raised bruise and [2] size > 2 inches (5 cm) and expanding    Negative:  [1] Caller has URGENT question AND [2] triager unable to answer question    Negative: [1] INCREASING pain in incision AND [2] > 2 days (48 hours) since surgery    Negative: [1] Small red area or streak AND [2] no fever    Negative: [1] Clear or blood-tinged fluid draining from wound AND [2] no fever    Negative: [1] Incision gaping open AND [2] > 48 hours since wound re-opened AND [3] length of opening > 1/2 inch (12 mm)    Negative: [1] Incision on face gaping open after skin glue AND [2] > 48 hours since wound re-opened AND [3] length of opening > 1/4 inch (6 mm)    Negative: Suture or staple removal is overdue    Negative: [1] Suture or staple came out early AND [2] caller wants wound checked    Negative: [1] Caller has NON-URGENT question AND [2] triager unable to answer question    Negative: Pimple where a stitch comes through the skin    Negative: Suture (or staple) came out early    Negative: Mild bruising near incision site    Negative: Suture removal date, questions about    Protocols used: POST-OP INCISION SYMPTOMS AND VEHOIQHEZ-O-LHELVIN HOUSTON RN [facility]

## 2022-01-15 NOTE — TELEPHONE ENCOUNTER
Patient reporting she had foot surgery on 1/6/22.    Patient reporting she did not receive discharge instructions printed out and would like to know if she can carry laundry and if she should be wearing her boot?    Reviewed after visit summary with patient.    Patient denies questions on symptoms.    Patient requesting assistance with My Chart. Transferred to My Chart support team.    Caller verbalized understanding. Denies further questions.    Mona Jewell RN  [facility]    COVID 19 Nurse Triage Plan/Patient Instructions    Please be aware that novel coronavirus (COVID-19) may be circulating in the community. If you develop symptoms such as fever, cough, or SOB or if you have concerns about the presence of another infection including coronavirus (COVID-19), please contact your health care provider or visit [email or web address].     Disposition/Instructions    Home care recommended. Follow home care protocol based instructions.    Thank you for taking steps to prevent the spread of this virus.  o Limit your contact with others.  o Wear a simple mask to cover your cough.  o Wash your hands well and often.    Resources    [facility]: About COVID-19: [email or web address]    CDC: What to Do If You're Sick: www.cdc.gov/coronavirus/2019-ncov/about/steps-when-sick.html    CDC: Ending Home Isolation: www.cdc.gov/coronavirus/2019-ncov/hcp/disposition-in-home-patients.html     CDC: Caring for Someone: www.cdc.gov/coronavirus/2019-ncov/if-you-are-sick/care-for-someone.html     [facility]: Interim Guidance for Hospital Discharge to Home: www.health.[facility].[place]/diseases/coronavirus/hcp/hospdischarge.pdf    [facility] clinical trials (COVID-19 research studies): clinicalaffairs.[email or web address].edu/um-clinical-trials     Below are the COVID-19 hotlines at the [place] Department of Health ([facility]). Interpreters are available.   o For health questions: Call 648-204-1338 or 1-263.928.5060 (7 a.m.  to 7 p.m.)  o For questions about schools and childcare: Call 908-087-3545 or 1-555.233.4929 (7 a.m. to 7 p.m.)                     Reason for Disposition    Health Information question, no triage required and triager able to answer question    Additional Information    Negative: [1] Caller is not with the adult (patient) AND [2] reporting urgent symptoms    Negative: Lab result questions    Negative: Medication questions    Negative: Caller can't be reached by phone    Negative: Caller has already spoken to PCP or another triager    Negative: RN needs further essential information from caller in order to complete triage    Negative: Requesting regular office appointment    Negative: [1] Caller requesting NON-URGENT health information AND [2] PCP's office is the best resource    Protocols used: INFORMATION ONLY CALL - NO TRIAGE-A-

## 2022-01-16 NOTE — TELEPHONE ENCOUNTER
Ary had Rt foot surgery on 1/6/22 with Dr. Cristobal.    Earlier today, she had gotten her ace bandage wet when trying to bathe. She states that she did have the bandage covered with a plastic bag.    After instructed by a nurse, she checked that the gauze wrap under the ace was not wet. She removed the ace bandage to dry it. She ended up drying it with a hair dryer.    Post op AVS instructed to Not remove dressing  She states that she had a visit on Wed, 1/12/22 for a dressing change. At that time she was told that she could remove the ace bandage. This is not seen on the visit note or AVS. Pt states that she did not receive an AVS on this day.    She feels that she has wrapped the ace too tightly. Advised removing and re-wrapping with not as much tension    COVID 19 Nurse Triage Plan/Patient Instructions    Please be aware that novel coronavirus (COVID-19) may be circulating in the community. If you develop symptoms such as fever, cough, or SOB or if you have concerns about the presence of another infection including coronavirus (COVID-19), please contact your health care provider or visit [email or web address].     Disposition/Instructions    Home care recommended. Follow home care protocol based instructions.    Thank you for taking steps to prevent the spread of this virus.  o Limit your contact with others.  o Wear a simple mask to cover your cough.  o Wash your hands well and often.    Resources    [facility]: About COVID-19: [email or web address]    CDC: What to Do If You're Sick: www.cdc.gov/coronavirus/2019-ncov/about/steps-when-sick.html    CDC: Ending Home Isolation: www.cdc.gov/coronavirus/2019-ncov/hcp/disposition-in-home-patients.html     CDC: Caring for Someone: www.cdc.gov/coronavirus/2019-ncov/if-you-are-sick/care-for-someone.html     [facility]: Interim Guidance for Hospital Discharge to Home: www.health.[facility].mn.us/diseases/coronavirus/hcp/hospdischarge.pdf    [facility]  [place] clinical trials (COVID-19 research studies): clinicalaffairs.[facility]-clinical-trials     Below are the COVID-19 hotlines at the [facility] ([facility]). Interpreters are available.   o For health questions: Call 756-387-9890 or 1-554.950.8676 (7 a.m. to 7 p.m.)  o For questions about schools and childcare: Call 374-892-7322 or 1-335.961.5297 (7 a.m. to 7 p.m.)     Di Polk RN  [facility]

## 2022-01-19 ENCOUNTER — OFFICE VISIT (OUTPATIENT)
Dept: PODIATRY | Facility: CLINIC | Age: 27
End: 2022-01-19
Payer: COMMERCIAL

## 2022-01-19 VITALS
WEIGHT: 171 LBS | TEMPERATURE: 97.2 F | HEIGHT: 62 IN | SYSTOLIC BLOOD PRESSURE: 140 MMHG | BODY MASS INDEX: 31.47 KG/M2 | DIASTOLIC BLOOD PRESSURE: 92 MMHG

## 2022-01-19 DIAGNOSIS — M77.51 BURSITIS OF RIGHT FOOT: ICD-10-CM

## 2022-01-19 DIAGNOSIS — Z96.9 RETAINED ORTHOPEDIC HARDWARE: ICD-10-CM

## 2022-01-19 PROCEDURE — 99024 POSTOP FOLLOW-UP VISIT: CPT | Performed by: PODIATRIST

## 2022-01-19 RX ORDER — OXYCODONE HYDROCHLORIDE 5 MG/1
5 TABLET ORAL EVERY 4 HOURS PRN
Qty: 5 TABLET | Refills: 0 | Status: SHIPPED | OUTPATIENT
Start: 2022-01-19

## 2022-01-19 ASSESSMENT — PAIN SCALES - GENERAL: PAINLEVEL: MODERATE PAIN (5)

## 2022-01-19 ASSESSMENT — MIFFLIN-ST. JEOR: SCORE: 1465.88

## 2022-01-19 NOTE — LETTER
"    1/19/2022         RE: Ary Wheeler  [address]  [place] [address]        Dear Colleague,    Thank you for referring your patient, Ary Wheeler, to the [facility]. Please see a copy of my visit note below.    Chief Complaint   Patient presents with     Surgical Followup     (1w6d) WB w/post op shoe, fell on 1/16/2022 w/o post op shoe and landed on Right heel; DOS 1/6/2022 - Excision hardware right foot     Works at [employer] in [employer], standing 10 hour work days    Subjective: Patient reports for followup of DOS 1/6/2022 - Excision hardware right foot procedure.  Patient utilized all the pain medication she was given by the end of last week and has had none since last week.  She admits that at 1 point last week she had to walk home and walk for a mile and a half.  She did not have any pain medication after this.    EXAM:  No apparent distress, patient is relaxed and comfortable.   Vitals: BP (!) 140/92 (BP Location: Left arm, Patient Position: Sitting, Cuff Size: Adult Regular)   Temp 97.2  F (36.2  C) (Temporal)   Ht 1.57 m (5' 1.81\")   Wt 77.6 kg (171 lb)   BMI 31.47 kg/m    Vasc:  DP and PT pulses palpable bilateral, CFT immediate to all digits and surrounding the surgical site.  Neuro:  Light touch sensation intact about the digits. There is minimal to no appreciable numbness around the surgical incision with examination.  Derm: The incision is well approximated and dry. Sutures are intact. Mild edema as expected. There is no surrounding erythema, heat, drainage or other signs of infection or hematoma.  Musc: Adequate reduction of deformity identified. No complications.    Assessment:      ICD-10-CM    1. Retained orthopedic hardware  Z96.9 oxyCODONE (ROXICODONE) 5 MG tablet   2. Bursitis of right foot  M77.51 oxyCODONE (ROXICODONE) 5 MG tablet       Plan:    1/19/2022  The dressing was removed and surgical site inspected.   All sutures were " removed  A bulky sterile dressing was applied with compression.   Patient instructed to reduce or discontinue pain medications as tolerated. Manage pain with reduced activity.   Continue ice and elevation as tolerated.   Stay in the postoperative shoe and compression dressing until no further discomfort is noted she can then progress to regular bathing regular activities regular shoe gear as tolerated as long as there is no irritation about the incision line.  Refilled 5 tablets of oxycodone and reminded her that discomfort should be minimal at this point as we did not put any of this hardware in we took it out.  No change to bone.  Follow-up in 2 weeks and likely consider return to work at that time      Candido Cristobal DPM        Again, thank you for allowing me to participate in the care of your patient.        Sincerely,        Candido Cristobal DPM

## 2022-01-19 NOTE — LETTER
[address] [address]  [phone]    2022      RE:  Ary Wheeler  : 1995      To whom it may concern:    This patient seen in clinic today and progressing well. Has appt 22 and will evaluate to consider return to work after that time    Sincerely,          Candido Cristobal DPM

## 2022-01-26 ENCOUNTER — TELEPHONE (OUTPATIENT)
Dept: PODIATRY | Facility: CLINIC | Age: 27
End: 2022-01-26
Payer: COMMERCIAL

## 2022-01-26 ENCOUNTER — NURSE TRIAGE (OUTPATIENT)
Dept: NURSING | Facility: CLINIC | Age: 27
End: 2022-01-26
Payer: COMMERCIAL

## 2022-01-27 NOTE — TELEPHONE ENCOUNTER
Patient is requesting to know if she can take off her compression bandage that was applied during her suture removal.  Notes in Epic state that compression bandage and orthotic shoe can be removed is pain is improved  Triage guidelines recommend to home care  Caller verbalized and understands directives  COVID 19 Nurse Triage Plan/Patient Instructions    Please be aware that novel coronavirus (COVID-19) may be circulating in the community. If you develop symptoms such as fever, cough, or SOB or if you have concerns about the presence of another infection including coronavirus (COVID-19), please contact your health care provider or visit [email or web address].     Disposition/Instructions    Home care recommended. Follow home care protocol based instructions.    Thank you for taking steps to prevent the spread of this virus.  o Limit your contact with others.  o Wear a simple mask to cover your cough.  o Wash your hands well and often.    Resources    [facility]: About COVID-19: [email or web address]    CDC: What to Do If You're Sick: www.cdc.gov/coronavirus/2019-ncov/about/steps-when-sick.html    CDC: Ending Home Isolation: www.cdc.gov/coronavirus/2019-ncov/hcp/disposition-in-home-patients.html     CDC: Caring for Someone: www.cdc.gov/coronavirus/2019-ncov/if-you-are-sick/care-for-someone.html     [facility]: Interim Guidance for Hospital Discharge to Home: www.health.[facility].[place].us/diseases/coronavirus/hcp/hospdischarge.pdf    [facility] clinical trials (COVID-19 research studies): clinicalaffairs.[facility]-clinical-trials     Below are the COVID-19 hotlines at the [facility] of Health ([facility]). Interpreters are available.   o For health questions: Call 867-166-7002 or 1-969.235.4741 (7 a.m. to 7 p.m.)  o For questions about schools and childcare: Call 877-750-9256 or 1-392.761.9220 (7 a.m. to 7 p.m.)                     Reason for Disposition    Wound care after sutures (or  staples) removed, questions about    Additional Information    Negative: [1] Major abdominal surgical wound AND [2] visible internal organs    Negative: Sounds like a life-threatening emergency to the triager    Protocols used: SUTURE OR STAPLE [id number]

## 2022-02-03 ENCOUNTER — OFFICE VISIT (OUTPATIENT)
Dept: PODIATRY | Facility: CLINIC | Age: 27
End: 2022-02-03
Payer: COMMERCIAL

## 2022-02-03 VITALS
BODY MASS INDEX: 31.47 KG/M2 | WEIGHT: 171 LBS | HEIGHT: 62 IN | DIASTOLIC BLOOD PRESSURE: 92 MMHG | SYSTOLIC BLOOD PRESSURE: 138 MMHG

## 2022-02-03 DIAGNOSIS — Z96.9 RETAINED ORTHOPEDIC HARDWARE: Primary | ICD-10-CM

## 2022-02-03 PROCEDURE — 99024 POSTOP FOLLOW-UP VISIT: CPT | Performed by: PODIATRIST

## 2022-02-03 ASSESSMENT — PAIN SCALES - GENERAL: PAINLEVEL: MILD PAIN (3)

## 2022-02-03 ASSESSMENT — MIFFLIN-ST. JEOR: SCORE: 1465.88

## 2022-02-03 NOTE — LETTER
"    2/3/2022         RE: Ary Wheeler  [address]  [place] [facility] [address]        Dear Colleague,    Thank you for referring your patient, Ary Wheeler, to the [facility]. Please see a copy of my visit note below.    Chief Complaint   Patient presents with     Surgical Followup     (4w) fell on 2/3/2022; DOS 1/6/2022 - Excision hardware right foot; LOV 1/19/2022     Works at [employer] in [employer], standing 10 hour work days    Subjective: Patient reports for followup of DOS 1/6/2022 - Excision hardware right foot procedure.  Patient has not utilize any pain medication    Not back to work at [employer].    EXAM:  No apparent distress, patient is relaxed and comfortable.   Vitals: BP (!) 138/92 (BP Location: Left arm, Patient Position: Sitting, Cuff Size: Adult Regular)   Ht 1.57 m (5' 1.81\")   Wt 77.6 kg (171 lb)   BMI 31.47 kg/m    Vasc:  DP and PT pulses palpable bilateral, CFT immediate to all digits and surrounding the surgical site.  Neuro:  Light touch sensation intact about the digits. There is minimal to no appreciable numbness around the surgical incision with examination.  Derm: The incision is healed at this time.  Minimal edema and no erythema or discomfort with direct palpation.  No pain throughout range of motion of the ankle subtalar midtarsal metatarsal phalangeal joints bilateral.  Musc: Adequate reduction of deformity identified. No complications.    Assessment:      ICD-10-CM    1. Retained orthopedic hardware  Z96.9        Plan:    1/19/2022  The dressing was removed and surgical site inspected.   All sutures were removed  A bulky sterile dressing was applied with compression.   Patient instructed to reduce or discontinue pain medications as tolerated. Manage pain with reduced activity.   Continue ice and elevation as tolerated.   Stay in the postoperative shoe and compression dressing until no further discomfort is noted she can then progress to " regular bathing regular activities regular shoe gear as tolerated as long as there is no irritation about the incision line.  Refilled 5 tablets of oxycodone and reminded her that discomfort should be minimal at this point as we did not put any of this hardware in we took it out.  No change to bone.  Follow-up in 2 weeks and likely consider return to work at that time    2/3/2022  Patient is very pleased with the result and feels like her foot feels near normal now.  Recommend she return to work half days next week and the week after that she can return unrestricted.  All questions were answered  Follow-up as needed.      Candido Cristobal DPM        Again, thank you for allowing me to participate in the care of your patient.        Sincerely,        Candido Cristobal DPM

## 2022-02-03 NOTE — LETTER
[address] [address]  [phone]    2/3/2022      RE:  Ary Wheeler  : 1995      To whom it may concern:    This patient may return to work with no restrictions on 22.     Sincerely,          Candido Cristobal DPM

## 2022-02-03 NOTE — PROGRESS NOTES
"Chief Complaint   Patient presents with     Surgical Followup     (4w) fell on 2/3/2022; DOS 1/6/2022 - Excision hardware right foot; LOV 1/19/2022     Works at [employer] in [employer], standing 10 hour work days    Subjective: Patient reports for followup of DOS 1/6/2022 - Excision hardware right foot procedure.  Patient has not utilize any pain medication    Not back to work at [employer].    EXAM:  No apparent distress, patient is relaxed and comfortable.   Vitals: BP (!) 138/92 (BP Location: Left arm, Patient Position: Sitting, Cuff Size: Adult Regular)   Ht 1.57 m (5' 1.81\")   Wt 77.6 kg (171 lb)   BMI 31.47 kg/m    Vasc:  DP and PT pulses palpable bilateral, CFT immediate to all digits and surrounding the surgical site.  Neuro:  Light touch sensation intact about the digits. There is minimal to no appreciable numbness around the surgical incision with examination.  Derm: The incision is healed at this time.  Minimal edema and no erythema or discomfort with direct palpation.  No pain throughout range of motion of the ankle subtalar midtarsal metatarsal phalangeal joints bilateral.  Musc: Adequate reduction of deformity identified. No complications.    Assessment:      ICD-10-CM    1. Retained orthopedic hardware  Z96.9        Plan:    1/19/2022  The dressing was removed and surgical site inspected.   All sutures were removed  A bulky sterile dressing was applied with compression.   Patient instructed to reduce or discontinue pain medications as tolerated. Manage pain with reduced activity.   Continue ice and elevation as tolerated.   Stay in the postoperative shoe and compression dressing until no further discomfort is noted she can then progress to regular bathing regular activities regular shoe gear as tolerated as long as there is no irritation about the incision line.  Refilled 5 tablets of oxycodone and reminded her that discomfort should be minimal at this point as we did not put any of this hardware in we " took it out.  No change to bone.  Follow-up in 2 weeks and likely consider return to work at that time    2/3/2022  Patient is very pleased with the result and feels like her foot feels near normal now.  Recommend she return to work half days next week and the week after that she can return unrestricted.  All questions were answered  Follow-up as needed.      Candido Cristobal DPM

## 2022-02-03 NOTE — LETTER
[address] [address]  [phone]    2/3/2022      RE:  Ary Wheeler  : 1995      To whom it may concern:    This patient may return to work 5 hours per 24 hour day on 22  then no restrictions on 2/15/22.     Sincerely,          Candido Cristobal DPM

## 2022-03-27 ENCOUNTER — HEALTH MAINTENANCE LETTER (OUTPATIENT)
Age: 27
End: 2022-03-27

## 2022-09-24 ENCOUNTER — HEALTH MAINTENANCE LETTER (OUTPATIENT)
Age: 27
End: 2022-09-24

## 2023-03-27 NOTE — PROGRESS NOTES
"Chief Complaint   Patient presents with     Surgical Followup     (1w6d) WB w/post op shoe, fell on 1/16/2022 w/o post op shoe and landed on Right heel; DOS 1/6/2022 - Excision hardware right foot     Works at [employer] in [place], standing 10 hour work days    Subjective: Patient reports for followup of DOS 1/6/2022 - Excision hardware right foot procedure.  Patient utilized all the pain medication she was given by the end of last week and has had none since last week.  She admits that at 1 point last week she had to walk home and walk for a mile and a half.  She did not have any pain medication after this.    EXAM:  No apparent distress, patient is relaxed and comfortable.   Vitals: BP (!) 140/92 (BP Location: Left arm, Patient Position: Sitting, Cuff Size: Adult Regular)   Temp 97.2  F (36.2  C) (Temporal)   Ht 1.57 m (5' 1.81\")   Wt 77.6 kg (171 lb)   BMI 31.47 kg/m    Vasc:  DP and PT pulses palpable bilateral, CFT immediate to all digits and surrounding the surgical site.  Neuro:  Light touch sensation intact about the digits. There is minimal to no appreciable numbness around the surgical incision with examination.  Derm: The incision is well approximated and dry. Sutures are intact. Mild edema as expected. There is no surrounding erythema, heat, drainage or other signs of infection or hematoma.  Musc: Adequate reduction of deformity identified. No complications.    Assessment:      ICD-10-CM    1. Retained orthopedic hardware  Z96.9 oxyCODONE (ROXICODONE) 5 MG tablet   2. Bursitis of right foot  M77.51 oxyCODONE (ROXICODONE) 5 MG tablet       Plan:    1/19/2022  The dressing was removed and surgical site inspected.   All sutures were removed  A bulky sterile dressing was applied with compression.   Patient instructed to reduce or discontinue pain medications as tolerated. Manage pain with reduced activity.   Continue ice and elevation as tolerated.   Stay in the postoperative shoe and compression dressing " until no further discomfort is noted she can then progress to regular bathing regular activities regular shoe gear as tolerated as long as there is no irritation about the incision line.  Refilled 5 tablets of oxycodone and reminded her that discomfort should be minimal at this point as we did not put any of this hardware in we took it out.  No change to bone.  Follow-up in 2 weeks and likely consider return to work at that time      Candido Cristobal DPM     Nasalis-Muscle-Based Myocutaneous Island Pedicle Flap Text: Using a #15 blade, an incision was made around the donor flap to the level of the nasalis muscle. Wide lateral undermining was then performed in both the subcutaneous plane above the nasalis muscle, and in a submuscular plane just above periosteum. This allowed the formation of a free nasalis muscle axial pedicle (based on the angular artery) which was still attached to the actual cutaneous flap, increasing its mobility and vascular viability. Hemostasis was obtained with pinpoint electrocoagulation. The flap was mobilized into position and the pivotal anchor points positioned and stabilized with buried interrupted sutures. Subcutaneous and dermal tissues were closed in a multilayered fashion with sutures. Tissue redundancies were excised, and the epidermal edges were apposed without significant tension and sutured with sutures.

## 2023-05-08 ENCOUNTER — HEALTH MAINTENANCE LETTER (OUTPATIENT)
Age: 28
End: 2023-05-08

## 2024-07-14 ENCOUNTER — HEALTH MAINTENANCE LETTER (OUTPATIENT)
Age: 29
End: 2024-07-14

## (undated) DEVICE — DRSG GAUZE 4X4" 2187

## (undated) DEVICE — SOL NACL 0.9% IRRIG 1000ML BOTTLE 07138-09

## (undated) DEVICE — GLOVE BIOGEL PI SZ 7.5 40875

## (undated) DEVICE — DRSG GAUZE 4X4" 3033

## (undated) DEVICE — SU VICRYL 3-0 PS-2 27" UND J427H

## (undated) DEVICE — PACK EXTREMITY SOP15EXFSD

## (undated) DEVICE — DRSG KERLIX 4 1/2"X4YDS ROLL 6730

## (undated) DEVICE — GLOVE ESTEEM POWDER FREE 8.0  2D72PL80

## (undated) DEVICE — BNDG COBAN 4"X5YDS STERILE

## (undated) DEVICE — GLOVE ESTEEM BLUE W/NEU-THERA 7.5  2D73PB75

## (undated) DEVICE — DRSG ADAPTIC 3X3" 6112

## (undated) DEVICE — GLOVE PROTEXIS W/NEU-THERA 7.5  2D73TE75

## (undated) DEVICE — GLOVE PROTEXIS W/NEU-THERA 8.0  2D73TE80

## (undated) RX ORDER — BUPIVACAINE HYDROCHLORIDE 5 MG/ML
INJECTION, SOLUTION EPIDURAL; INTRACAUDAL
Status: DISPENSED
Start: 2022-01-06

## (undated) RX ORDER — DEXAMETHASONE SODIUM PHOSPHATE 10 MG/ML
INJECTION, SOLUTION INTRAMUSCULAR; INTRAVENOUS
Status: DISPENSED
Start: 2022-01-06

## (undated) RX ORDER — ONDANSETRON 2 MG/ML
INJECTION INTRAMUSCULAR; INTRAVENOUS
Status: DISPENSED
Start: 2022-01-06

## (undated) RX ORDER — FENTANYL CITRATE 50 UG/ML
INJECTION, SOLUTION INTRAMUSCULAR; INTRAVENOUS
Status: DISPENSED
Start: 2022-01-06

## (undated) RX ORDER — LIDOCAINE HYDROCHLORIDE 20 MG/ML
INJECTION, SOLUTION EPIDURAL; INFILTRATION; INTRACAUDAL; PERINEURAL
Status: DISPENSED
Start: 2022-01-06